# Patient Record
(demographics unavailable — no encounter records)

---

## 2024-11-28 NOTE — HISTORY OF PRESENT ILLNESS
[TextBox_4] : doing well lymphoma an dlung cancer, although operated on at [facility] when there was a minimal area of solidity there was adenoca she says not "in situ", but I explained to her that survival figures are based upon the ground glas there are other area of ground glass, and i  worry that [facility] will operate too earily on thiem every 12 monht follow up being, done, but this geing almost pure ground glass, i don't know what they're expecting to find with such a short follow up    she is sob after her knee operation, but her siproemtry and chest film is unchanged. her d dimer is elevated an appropriate level after knee surgery sob with d dimer.

## 2024-11-28 NOTE — HISTORY OF PRESENT ILLNESS
[TextBox_4] : doing well lymphoma an dlung cancer, although operated on at [facility] when there was a minimal area of solidity there was adenoca she says not "in situ", but I explained to her that survival figures are based upon the ground glas there are other area of ground glass, and i  worry that [facility] will operate too earily on thiem every 12 monht follow up being, done, but this geing almost pure ground glass, i don't know what they're expecting to find with such a short follow up    she is sob after her knee operation, but her siproemtry and chest film is unchanged. her d dimer is elevated an appropriate level after knee surgery sob with d dimer.

## 2024-11-28 NOTE — REVIEW OF SYSTEMS
[Cough] : no cough [Sputum] : no sputum [Dyspnea] : dyspnea [Anxiety] : anxiety [Negative] : Endocrine

## 2024-11-28 NOTE — DISCUSSION/SUMMARY
[FreeTextEntry1] : this dyspnea is due to decondiioning from her recent surgery she is anxious because of this feeling but I do not see an alternative dx the d dimer ould be expected to still be elevated to the level it is at , with age adjustment and post opr

## 2024-11-28 NOTE — PHYSICAL EXAM
[No Acute Distress] : no acute distress [Normal Oropharynx] : normal oropharynx [Normal Appearance] : normal appearance [No Neck Mass] : no neck mass [Normal Rate/Rhythm] : normal rate/rhythm [Normal S1, S2] : normal s1, s2 [No Murmurs] : no murmurs [No Resp Distress] : no resp distress [Clear to Auscultation Bilaterally] : clear to auscultation bilaterally [No Abnormalities] : no abnormalities [Normal Gait] : normal gait [No Clubbing] : no clubbing [No Edema] : no edema [Normal Color/ Pigmentation] : normal color/ pigmentation [No Focal Deficits] : no focal deficits [No Motor Deficits] : no motor deficits [Oriented x3] : oriented x3 [Normal Affect] : normal affect [TextBox_68] : clear